# Patient Record
Sex: MALE | Race: WHITE
[De-identification: names, ages, dates, MRNs, and addresses within clinical notes are randomized per-mention and may not be internally consistent; named-entity substitution may affect disease eponyms.]

---

## 2018-02-13 ENCOUNTER — HOSPITAL ENCOUNTER (INPATIENT)
Dept: HOSPITAL 68 - ERH | Age: 58
LOS: 4 days | DRG: 897 | End: 2018-02-17
Attending: INTERNAL MEDICINE | Admitting: INTERNAL MEDICINE
Payer: COMMERCIAL

## 2018-02-13 VITALS — HEIGHT: 69 IN | WEIGHT: 205 LBS | BODY MASS INDEX: 30.36 KG/M2

## 2018-02-13 DIAGNOSIS — R00.0: ICD-10-CM

## 2018-02-13 DIAGNOSIS — I10: ICD-10-CM

## 2018-02-13 DIAGNOSIS — F41.9: ICD-10-CM

## 2018-02-13 DIAGNOSIS — M25.50: ICD-10-CM

## 2018-02-13 DIAGNOSIS — F32.9: ICD-10-CM

## 2018-02-13 DIAGNOSIS — Z88.0: ICD-10-CM

## 2018-02-13 DIAGNOSIS — F10.239: Primary | ICD-10-CM

## 2018-02-13 DIAGNOSIS — F12.20: ICD-10-CM

## 2018-02-13 LAB
ABSOLUTE GRANULOCYTE CT: 4.1 /CUMM (ref 1.4–6.5)
BASOPHILS # BLD: 0 /CUMM (ref 0–0.2)
BASOPHILS NFR BLD: 0.4 % (ref 0–2)
EOSINOPHIL # BLD: 0 /CUMM (ref 0–0.7)
EOSINOPHIL NFR BLD: 0.5 % (ref 0–5)
ERYTHROCYTE [DISTWIDTH] IN BLOOD BY AUTOMATED COUNT: 14.1 % (ref 11.5–14.5)
GRANULOCYTES NFR BLD: 58.4 % (ref 42.2–75.2)
HCT VFR BLD CALC: 45.4 % (ref 42–52)
LYMPHOCYTES # BLD: 2.5 /CUMM (ref 1.2–3.4)
MCH RBC QN AUTO: 32 PG (ref 27–31)
MCHC RBC AUTO-ENTMCNC: 34.4 G/DL (ref 33–37)
MCV RBC AUTO: 92.8 FL (ref 80–94)
MONOCYTES # BLD: 0.4 /CUMM (ref 0.1–0.6)
PLATELET # BLD: 203 /CUMM (ref 130–400)
PMV BLD AUTO: 8 FL (ref 7.4–10.4)
RED BLOOD CELL CT: 4.89 /CUMM (ref 4.7–6.1)
WBC # BLD AUTO: 7 /CUMM (ref 4.8–10.8)

## 2018-02-13 PROCEDURE — G0480 DRUG TEST DEF 1-7 CLASSES: HCPCS

## 2018-02-13 PROCEDURE — 2NBSP: CPT

## 2018-02-13 NOTE — ED PSYCHIATRIC COMPLAINT
History of Present Illness
 
General
Chief Complaint: ETOH/Drug Related Complaint
Stated Complaint: REQUESTING HIGHWATCH
Source: patient
Exam Limitations: no limitations
 
Vital Signs & Intake/Output
Vital Signs & Intake/Output
 Vital Signs
 
 
Date Time Temp Pulse Resp B/P B/P Pulse O2 O2 Flow FiO2
 
     Mean Ox Delivery Rate 
 
02/15 0936  102  132/90     
 
02/15 0800  102 18 132/90     
 
02/15 0651    146/88     
 
02/15 0641 98.4 96 20 150/100  97 Room Air  
 
02/14 2213 98.1 114 18 152/80  98 Room Air  
 
02/14 2000    132/78     
 
02/14 2000    132/78     
 
02/14 1711 97.9 734 45 6024/10     
 
02/14 1711 97.9 109 20 160/100  96 Room Air  
 
02/14 1610 98.0 104 16 149/90     
 
02/14 1610 98.0 104 16 140/90  98 Room Air  
 
02/14 1311 98.1 120 18 138/98  98 Room Air  
 
 
 ED Intake and Output
 
 
 02/15 0000 02/14 1200
 
Intake Total  
 
Output Total  
 
Balance  
 
   
 
Patient 205 lb 
 
Weight  
 
Weight Reported by Patient 
 
Measurement  
 
Method  
 
 
 
Allergies
Coded Allergies:
Penicillins (HIVES 02/13/18)
 
Reconcile Medications
Citalopram Hydrobromide (Citalopram HBr) 20 MG TABLET   1 TAB PO DAILY 
DEPRESSION  (Reported)
Lisinopril 40 MG TABLET   1 TAB PO DAILY HIGH BLOOD PRESSURE  (Reported)
 
Triage Note:
PT HERE FOR HIGHWATCH CLEARANCE STATES HIS BAL
 WAS TOO HIGH FOR HIM TO STAY THERE.  PT STATES HIS
 LAST DRINK WAS LAST EVENING.  PT ADMITS TO
 DRINKING 1 PINT OF VODKA DAILY.  PT DENIES DRUG
 USE.  PT DENIES SI/HI PT
Triage Nurses Notes Reviewed? yes
HPI:
Patient presents for evaluation of alcohol dependence.  Patient states that he 
has been drinking a pint of vodka daily for at least 10 years, off and on.  
Patient denies history of seizures.  He intends to go into alcohol 
rehabilitation at [facility].  He reported there today but was too intoxicated. 
He has no specific complaint at this time.
(Marianne WILLIS,Kendall WOLFF)
 
Past History
 
Travel History
Traveled to [place] past 21 day No
 
Medical History
Any Pertinent Medical History? see below for history
Cardiovascular: hypertension
Psychiatric: alcohol dependence, anxiety
 
Surgical History
Surgical History: non-contributory
 
Psychosocial History
What is your primary language English
Tobacco Use: Quit >30 days ago
ETOH Use: alcoholic
Illicit Drug Use: denies illicit drug use, marijuana (medicinal)
 
Family History
Hx Contributory? No
(Marianne WILLIS,Kendall WOLFF)
 
Review of Systems
 
 
Physical Exam
 
Physical Exam
General Appearance: see below
Neurological/Psychiatric: see below
Comments:
General: Alert, calm, cooperative
Head: Normocephalic, atraumatic
Eyes: Normal inspection, no nystagmus, EOMI
Ears: Normal inspection
Nose: Normal inspection
Throat: Moist mucosa
Neck: Supple, no goiter
Heart: Regular rate and rhythm, no murmurs rubs or gallops
Lungs: Clear to auscultation bilaterally with good air entry
Abdomen: Soft nontender nondistended, normal bowel sounds
Chest: Nontender
Extremities: Normal range of motion grossly, mild tremors present, no cyanosis 
clubbing or edema of the upper extremities
Neurologic: cranial nerves II through XII grossly intact, speech clear, gait 
normal
Psychiatric: No apparent delusions or hallucinations, no pressured speech or 
thought blocking
SAD PERSONS Done? patient not suicidal
(Marianne WILLIS,Kendall WOLFF)
 
Progress
Differential Diagnosis: alcohol withdrawal, alcohol intoxication, electrolyte 
abnormality, dehydration, psychiatric disorder
Plan of Care:
 Orders
 
 
Procedure Date/time Status
 
BASIC ELECTROLYTES PLUS BUN&CR 02/15 0500 Complete
 
MISSING MEDICATION FORM 02/15  UNK Active
 
Heart Healthy Diet 02/14 D Active
 
Vital Signs 02/14 1759 Active
 
Teach/Educate 02/14 1759 Active
 
Pain Treatment and Response 02/14 1759 Active
 
Nutritional Intake, Monitor 02/14 1759 Active
 
Isolation 02/14 1759 Active
 
Intake & Output 02/14 1759 Active
 
Patient Care Conference 02/14 1759 Active
 
Activity/Ambulation 02/14 1759 Active
 
Pathway - chart 02/14 1357 Active
 
House Staff 02/14 1357 Active
 
Code Status 02/14 1357 Active
 
LACTIC ACID 02/14 1348 Complete
 
Add-on Test (ER Only) 02/14 1333 Active
 
Admit to inpatient 02/14 1332 Active
 
Patient Data 02/14 1327 Active
 
ED Holding Orders 02/14 1309 Active
 
Vital Signs 02/14 1309 Active
 
Code Status 02/14 1309 Complete
 
VTE Mechanical Prophylaxis 02/14  UNK Active
 
Intake & Output 02/13 2032 Active
 
THYROID STIMULATING HORMONE 02/13 1740 Complete
 
 
 Current Medications
 
 
  Sig/Dang Start time  Last
 
Medication Dose  Stop Time Status Admin
 
Lorazepam 1 MG Q12H 02/16 0000 CAN 
 
(Ativan)   02/16 1201  
 
Lorazepam 1.5 MG BID 02/15 2200 AC 
 
(Ativan)     
 
Citalopram  20 MG DAILY 02/15 1000 AC 02/15
 
Hydrobromide     0936
 
(Celexa)     
 
Enoxaparin Sodium 40 MG DAILY 02/15 1000 AC 02/15
 
(Lovenox)     0936
 
Lisinopril 40 MG DAILY 02/15 1000 AC 02/15
 
(Prinivil)     0936
 
Lorazepam 0 Q1P PRN 02/14 1700 AC 
 
(Ativan)     
 
Folic Acid 1 MG DAILY 02/14 1000 AC 02/15
 
(Folic Acid)   02/16 1001  0936
 
Multivitamins 1 TAB DAILY 02/14 1000 AC 02/15
 
(Theragran Vitamins)     0936
 
Thiamine HCl 100 MG DAILY 02/14 1000 AC 02/15
 
(Vitamin B1)   02/16 1001  0935
 
 
 Laboratory Tests
 
 
 
02/15/18 0435:
Anion Gap 11, Estimated GFR > 60, BUN/Creatinine Ratio 25.0
 
02/14/18 1435:
Lactic Acid 1.1
 
2/14/2018 7:17:40 AM
Patient presenting with alcohol withdrawal.  May required Saxon to the 
hospital versus disposition to high watch patient medically stable.
 
 
10:05 AM
MUCH IMPROVED AT THIS TIME.  .  NO TREMORS.  WILL CONTINUE TO MONITOR, 
WILL LIKLELY GO TO HIGH WATCH.  WILL HOLD ATIVAN AT THIS TIME.
 
 
12:20 PM
PATIENT FEELS WELL, NO CONFUSION.  NO ATIVAN SINCE 6:40 AM.  STABLE FOR 
HIGHWATCH.
(Erasmo WILLIS,Leelee)
Comments:
2/13/2018 11:27:23 PM patient signed out to Dr. Steward at shift change over.
(Marianne WILLIS,Kendall WOLFF)
Initial ED EKG: SINUS TACHYCARDIA @ 109 BPM
(Erasmo WILLIS,Leelee)
 
Departure
 
Departure
Condition: Stable
Clinical Impression
Primary Impression: Alcohol dependence
Qualifiers:  Substance use status: uncomplicated Qualified Code: F10.20 - 
Alcohol dependence, uncomplicated
Referrals:
Patient Has No Primary Care Dr (PCP/Family)
 
Departure Forms:
Customer Survey
General Discharge Information
(Marianne WILLIS,Kendall WOLFF)
 
Departure
Comments
2/14/17, 7am. 
pt signed out to dr. carpenter
(Nichelle WILLIS,Ovidio FALL)
 
Departure
Time of Disposition: 1249
Disposition: STILL A PATIENT
 
Admission Note
Spoke With:
Yadira WILLIS,Nura
Documentation of Exam:
Documentation of any treatments & extenuating circumstances including Concerns 
Regarding Discharge (functional status, medication knowledge or non-compliance, 
living conditions, etc.) that warrant an admission rather than observation: [
CIWA MONITORING, ATIVAN TAPER, BP MANAGEMENT, CRISIS CONSULTATION, PLACEMENT FOR
ALCOHOL REHAB]
 
(Erasmo WILLIS,[facility])

## 2018-02-14 VITALS — SYSTOLIC BLOOD PRESSURE: 152 MMHG | DIASTOLIC BLOOD PRESSURE: 80 MMHG

## 2018-02-14 VITALS — DIASTOLIC BLOOD PRESSURE: 116 MMHG | SYSTOLIC BLOOD PRESSURE: 174 MMHG

## 2018-02-14 VITALS — SYSTOLIC BLOOD PRESSURE: 1660 MMHG | DIASTOLIC BLOOD PRESSURE: 10 MMHG

## 2018-02-14 VITALS — SYSTOLIC BLOOD PRESSURE: 149 MMHG | DIASTOLIC BLOOD PRESSURE: 90 MMHG

## 2018-02-14 VITALS — SYSTOLIC BLOOD PRESSURE: 132 MMHG | DIASTOLIC BLOOD PRESSURE: 78 MMHG

## 2018-02-14 NOTE — HISTORY & PHYSICAL
[facility] 02[facility]14[facility] 1336:
General Information and HPI
MD Statement:
I have seen and personally examined MEERA FIORE and documented this H&P.
 
The patient is a 57 year old M who presented with a patient stated chief 
complaint of [alcohol withdrawal].
 
Source of Information: patient
Exam Limitations: no limitations
History of Present Illness:
57-year-old male Presented from [facility] for alcohol withdrawal evaluation.  
The patient reported drinking almost daily for the past 10 years, his last drink
was yesterday.  On average he drinks half pints of vodka every day for the past 
2 months.  The patient reported that he try to detox at home multiple times in 
the past without any complication.  The patient denies any previous alcohol 
detox related admissions, seizure, hallucinations, or tremors. 
 
Patient denies smoking however he reports medical marijuana use intermittently 
for joints pain control.
 
Allergies/Medications
Allergies:
Coded Allergies:
Penicillins (HIVES 02/13/18)
 
Home Med list
Citalopram Hydrobromide (Citalopram HBr) 20 MG TABLET   1 TAB PO DAILY 
DEPRESSION  (Reported)
Lisinopril 40 MG TABLET   1 TAB PO DAILY HIGH BLOOD PRESSURE  (Reported)
 
 
Past History
 
Travel History
Traveled to [place] past 21 day No
 
Medical History
Neurological: ANXIETY
Cardiovascular: hypertension
Psychiatric: alcohol dependence, anxiety
 
Surgical History
Surgical History: non-contributory
 
Past Family/Social History
 
Psychosocial History
ETOH Use: alcoholic
Illicit Drug Use: denies illicit drug use, marijuana (medicinal)
 
Review of Systems
 
Review of Systems
Constitutional:
Reports: see HPI. 
 
Exam & Diagnostic Data
Last 24 Hrs of Vital Signs/I&O
 Vital Signs
 
 
Date Time Temp Pulse Resp B/P B/P Pulse O2 O2 Flow FiO2
 
     Mean Ox Delivery Rate 
 
02/14 1610 98.0 104 16 149/90     
 
02/14 1610 98.0 104 16 140/90  98 Room Air  
 
02/14 1311 98.1 120 18 138/98  98 Room Air  
 
02/14 1122 98.0 119 18 131/78  98 Room Air  
 
02/14 0920  113 18 131/79  98 Room Air  
 
02/14 0835 98.9 133 18 157/104  98 Room Air  
 
02/14 0717  130 20 141/102  98 Room Air  
 
02/14 0642 99.9 125 24 174/116     
 
02/14 0642 99.9 125 24 174/116     
 
02/14 0627 99.9 125 24 174/116     
 
02/14 0626 99.9 125 20 174/116  97 Room Air  
 
02/14 0332 97.0 98 20 142/65  99 Room Air  
 
02/14 0116 97.4 100 20 158/90  99 Room Air  
 
02/13 2354    164/82     
 
02/13 2131 97.7 109 16 166/96  97   
 
02/13 1951 97.2 131 16 131/73  96 Room Air  
 
02/13 1734 97.2 113 16 122/81  96 Room Air  
 
 
 Intake & Output
 
 
 02/14 1600 02/14 0800 02/14 0000
 
Intake Total   300
 
Output Total   
 
Balance   300
 
    
 
Intake, Oral   300
 
Patient   94.801 kg
 
Weight   
 
Weight   Reported by Patient
 
Measurement   
 
Method   
 
 
 
 
Physical Exam
General Appearance Alert, Oriented X3, Cooperative, No Acute Distress
Skin No Rashes
HEENT Atraumatic, PERRLA, EOMI, Mucous Membr. moist/pink
Neck No JVD
Cardiovascular Regular Rate, Normal S1, Normal S2, No Murmurs, TACHYCARDIA
Lungs Clear to Auscultation, Normal Air Movement
Abdomen Soft, No Tenderness
Neurological Normal Gait, Normal Speech, Strength at 5/5 X4 Ext, Sensation 
Intact, Cranial Nerves 3-12 NL, Reflexes 2+
Extremities No Clubbing, No Cyanosis, No Edema
Last 24 Hrs of Labs/Aiden:
 Laboratory Tests
 
02/14/18 1435:
Lactic Acid 1.1
 
02/13/18 2019:
Urine Opiates Screen < 100.00, Methadone Screen 67, Barbiturate Screen < 60, Ur 
Phencyclidine Scrn < 6.00, Amphetamines Screen < 100, U Benzodiazepines Scrn < 
85, Urine Cocaine Screen < 50, Urine Cannabis Screen 79.70  H
 
02/13/18 1740:
Anion Gap 22  H, Estimated GFR > 60, BUN/Creatinine Ratio 28.8  H, Glucose 94, 
Calcium 9.4, Total Bilirubin 1.5  H, AST 89  H, ALT 66, Alkaline Phosphatase 84,
Troponin I < 0.01, Total Protein 7.9, Albumin 4.7, Globulin 3.2, Albumin/
Globulin Ratio 1.5, TSH 1.480, CBC w Diff NO MAN DIFF REQ, RBC 4.89, MCV 92.8, 
MCH 32.0  H, MCHC 34.4, RDW 14.1, MPV 8.0, Gran % 58.4, Lymphocytes % 35.5, 
Monocytes % 5.2, Eosinophils % 0.5, Basophils % 0.4, Absolute Granulocytes 4.1, 
Absolute Lymphocytes 2.5, Absolute Monocytes 0.4, Absolute Eosinophils 0, 
Absolute Basophils 0, Serum Alcohol 183.0
 
 
Assessment/Plan
Assessment:
57-year-old male who presented for alcohol detox, while in the ED the patient 
was found to be anxious with a heart rate of 120s and BP 170s/110S. the patient 
has no history of alcohol withdrawal-related complication.  Patient has a past 
medical history of hypertension and an anxiety for which he is taking lisinopril
and Escitalopram.
 
#Alcohol detox
* Admitted to general medicine floor
* CIWA scoring system
* Ativan IV as per CIWA protocol.
* Oral folic acid and thiamine
 
#Hypertension/and anxiety
* Continue home medication including lisinopril and Escitalopram.
 
-Regular diet
-DVT PPx: Lovenox
-Full code
 
 
As Ranked By This Provider
Problem List:
 1. Alcohol dependence
   Qualifiers
 Substance use status: uncomplicated Qualified Code: F10.20 - Alcohol dependence
, uncomplicated
 
 
Core Measures/Misc (9/17)
 
Acute Coronary Syndrome
ACS Diagnosis: No
 
Congestive Heart Failure
Congestive Heart Failure Diagnosis No
 
Cerebrovascular Accident
CVA/TIA Diagnosis: No
 
VTE (View Protocol)
VTE Risk Factors Age>40
No Mechanical VTE Prophylaxis d/t N/A MechProphylax Ordered
No VTE Pharm Prophylaxis d/t NA PharmProphylax ordered
 
Sepsis (View protocol)
Sepsis Present: No
 
 
Carlos Manuel Loving MD 02/15/18 2759:
Attending MD Review Statement
 
Attending Statement
Attending MD Statement: examined this patient, discuss w/resident/PA/NP, agreed 
w/resident/PA/NP, reviewed EMR data (avail)

## 2018-02-15 VITALS — SYSTOLIC BLOOD PRESSURE: 140 MMHG | DIASTOLIC BLOOD PRESSURE: 92 MMHG

## 2018-02-15 VITALS — DIASTOLIC BLOOD PRESSURE: 88 MMHG | SYSTOLIC BLOOD PRESSURE: 146 MMHG

## 2018-02-15 VITALS — DIASTOLIC BLOOD PRESSURE: 102 MMHG | SYSTOLIC BLOOD PRESSURE: 142 MMHG

## 2018-02-15 VITALS — DIASTOLIC BLOOD PRESSURE: 98 MMHG | SYSTOLIC BLOOD PRESSURE: 142 MMHG

## 2018-02-15 VITALS — SYSTOLIC BLOOD PRESSURE: 132 MMHG | DIASTOLIC BLOOD PRESSURE: 90 MMHG

## 2018-02-15 VITALS — SYSTOLIC BLOOD PRESSURE: 152 MMHG | DIASTOLIC BLOOD PRESSURE: 100 MMHG

## 2018-02-15 VITALS — SYSTOLIC BLOOD PRESSURE: 150 MMHG | DIASTOLIC BLOOD PRESSURE: 100 MMHG

## 2018-02-15 NOTE — PN- HOUSESTAFF
**See Addendum**
Subjective
Follow-up For:
EtOH withdrawal
Subjective:
no complaints no significant withdrawal symptoms or anxiety
feeling well, wants to go to inpatient treatment, blood pressure has been 
elevated
on standing dose ativan
 
Review of Systems
Constitutional:
Reports: see HPI. 
 
Objective
Last 24 Hrs of Vital Signs/I&O
 Vital Signs
 
 
Date Time Temp Pulse Resp B/P B/P Pulse O2 O2 Flow FiO2
 
     Mean Ox Delivery Rate 
 
02/15 0936  102  132/90     
 
02/15 0800  102 18 132/90     
 
02/15 0651    146/88     
 
02/15 0641 98.4 96 20 150/100  97 Room Air  
 
 2213 98.1 114 18 152/80  98 Room Air  
 
2000    132/78     
 
2000    132/78     
 
 1711 97.9 038 36 7455/10     
 
 1711 97.9 109 20 160/100  96 Room Air  
 
 1610 98.0 104 16 149/90     
 
 1610 98.0 104 16 140/90  98 Room Air  
 
 1311 98.1 120 18 138/98  98 Room Air  
 
 1122 98.0 119 18 131/78  98 Room Air  
 
 
 Intake & Output
 
 
 02/15 1600 02/15 0800 02/15 0000
 
Intake Total   
 
Output Total   
 
Balance   
 
    
 
Patient   92.986 kg
 
Weight   
 
Weight   Reported by Patient
 
Measurement   
 
Method   
 
 
 
 
Physical Exam
General Appearance: Alert, Oriented X3, Cooperative, No Acute Distress
Cardiovascular: Regular Rate, Normal S1, Normal S2, No Murmurs
Lungs: Clear to Auscultation, Normal Air Movement
Abdomen: Normal Bowel Sounds, Soft, No Tenderness, No Masses
Extremities: No Clubbing, No Cyanosis, No Edema, Normal Pulses
Current Medications:
 Current Medications
 
 
  Sig/Dang Start time  Last
 
Medication Dose Route Stop Time Status Admin
 
Acetaminophen 0 .STK-MED ONE  1429 DC 
 
  PO   
 
Acetaminophen 975 MG ONCE ONE  1315 DC 
 
  PO  1316  1426
 
Citalopram  20 MG DAILY 02/15 1000 AC 02/15
 
Hydrobromide  PO   0936
 
Enoxaparin Sodium 40 MG DAILY 02/15 1000 AC 02/15
 
  SC   0936
 
Folic Acid 1 MG DAILY  1000 AC 02/15
 
  PO  1001  0936
 
Gabapentin 300 MG Q8  0600 DC 
 
  PO   1304
 
Lisinopril 40 MG DAILY 02/15 1000 AC 02/15
 
  PO   0936
 
Lorazepam 1 MG Q12H  0000 CAN 
 
  PO  1201  
 
Lorazepam 1.5 MG BID 02/15 2200 AC 
 
  PO   
 
Lorazepam 1.5 MG Q6 02/15 0600 DC 02/15
 
  PO 02/15 1801  0513
 
Lorazepam 0 Q1P PRN  1700 AC 
 
  IV   
 
Lorazepam 2 MG Q6  0845 DC 
 
  PO 02/15 0001  2312
 
Lorazepam 2 MG Q2P PRN  0845 DC 
 
  IV   
 
Lorazepam 1 MG Q2P PRN  0845 DC 
 
  IV   
 
Multivitamins 1 TAB DAILY  1000 AC 02/15
 
  PO   0936
 
Thiamine HCl 100 MG DAILY  1000 AC 02/15
 
  PO  1001  0935
 
 
 
 
Last 24 Hrs of Lab/Aiden Results
Last 24 Hrs of Labs/Mics:
 Laboratory Tests
 
02/15/18 0435:
Anion Gap 11, Estimated GFR > 60, BUN/Creatinine Ratio 25.0
 
18 1435:
Lactic Acid 1.1
 
 
Assessment/Plan
Assessment:
57-year-old male who presented for alcohol detox, while in the ED the patient 
was found to be anxious with a heart rate of 120s and BP 170s/110S. the patient 
has no history of alcohol withdrawal-related complication.  Patient has a past 
medical history of hypertension and an anxiety for which he is taking lisinopril
and Escitalopram.
 
Alcohol detox:
* CIWA scoring 0-11 past 24 hours
 Decreased standing Ativan from 1.5mg Q6H to BID, 1mg tmrw morning then stop
* Ativan IV as per CIWA protocol.
* Oral folic acid and thiamine
 
Hypertension:
 Likely elevated from acute withdrawal
 Continue lisinopril and monitor blood pressure
Anxiety:
* Continue Escitalopram.
 
Regular diet
DVT ppx: Lovenox 40mg subcutaneous daily
Full code
Problem List:
 1. Alcohol dependence
 
Pain Ratin
Pain Location:
n/a
Pain Goal: Pain 4 or less
Pain Plan:
prn
Tomorrow's Labs & Rationales:
none

## 2018-02-16 VITALS — SYSTOLIC BLOOD PRESSURE: 144 MMHG | DIASTOLIC BLOOD PRESSURE: 84 MMHG

## 2018-02-16 VITALS — DIASTOLIC BLOOD PRESSURE: 96 MMHG | SYSTOLIC BLOOD PRESSURE: 130 MMHG

## 2018-02-16 VITALS — SYSTOLIC BLOOD PRESSURE: 138 MMHG | DIASTOLIC BLOOD PRESSURE: 104 MMHG

## 2018-02-16 VITALS — DIASTOLIC BLOOD PRESSURE: 104 MMHG | SYSTOLIC BLOOD PRESSURE: 138 MMHG

## 2018-02-16 VITALS — DIASTOLIC BLOOD PRESSURE: 100 MMHG | SYSTOLIC BLOOD PRESSURE: 140 MMHG

## 2018-02-16 VITALS — SYSTOLIC BLOOD PRESSURE: 130 MMHG | DIASTOLIC BLOOD PRESSURE: 90 MMHG

## 2018-02-16 NOTE — PN- HOUSESTAFF
Robert WILLIS,Nolan 18 0847:
Subjective
Follow-up For:
etoh detox
anxiety
htn
tachycardia
Subjective:
patient is not complaining of significant withdrawal symptoms
[facility]
remains tachycardic and hypertensive on ativan taper
 
Review of Systems
Constitutional:
Reports: see HPI. 
 
Objective
Last 24 Hrs of Vital Signs/I&O
 Vital Signs
 
 
Date Time Temp Pulse Resp B/P B/P Pulse O2 O2 Flow FiO2
 
     Mean Ox Delivery Rate 
 
 1020  114  142/90     
 
 1000 97.8 102 20 140/100  96 Room Air  
 
 0647 98.0 110 22 130/96  97 Room Air  
 
 0200  102  138/104     
 
 0148 97.6 102 22 138/104  97 Room Air  
 
02/15 2205 98.0 98 20 142/102  98 Room Air  
 
02/15 1600  104  140/92     
 
02/15 1600  105 18 140/92     
 
02/15 1542  118  142/98     
 
02/15 1413 97.8 108 20 152/100  97 Room Air  
 
 
 Intake & Output
 
 
  1600  0800  0000
 
Intake Total  480 250
 
Output Total   
 
Balance  480 250
 
    
 
Intake, IV   10
 
Intake, Oral  480 240
 
Number  0 0
 
Bowel   
 
Movements   
 
 
 
 
Physical Exam
General Appearance: Alert, Oriented X3, Cooperative, No Acute Distress
Cardiovascular: Regular Rate, Normal S1, Normal S2, No Murmurs
Lungs: Clear to Auscultation, Normal Air Movement
Abdomen: Normal Bowel Sounds, Soft, No Tenderness, No Masses
Extremities: No Clubbing, No Cyanosis, No Edema, Normal Pulses
Current Medications:
 Current Medications
 
 
  Sig/Dang Start time  Last
 
Medication Dose Route Stop Time Status Admin
 
Citalopram  20 MG DAILY 02/15 1000 AC 
 
Hydrobromide  PO   1020
 
Enoxaparin Sodium 40 MG DAILY 02/15 1000 AC 
 
  SC   1020
 
Folic Acid 1 MG DAILY  1000 DC 
 
  PO  1001  1020
 
Lisinopril 40 MG DAILY 02/15 1000 AC 
 
  PO   1020
 
Lorazepam 1 MG BID  1000 AC 
 
  PO   1020
 
Lorazepam 1.5 MG BID 02/15 2200 DC 
 
  PO   
 
Lorazepam 1 MG ONCE ONE 02/15 1600 DC 
 
  IV 02/15 1601  
 
Lorazepam 1.5 MG Q8 02/15 1550 DC 
 
  PO   0559
 
Lorazepam 1 MG ONCE ONE 02/15 1430 DC 02/15
 
  IV 02/15 1431  1447
 
Lorazepam 0 Q1P PRN  1700 AC 
 
  IV   
 
Magnesium Oxide 400 MG ONE ONE 02/15 1915 DC 02/15
 
  PO 02/15 1916  2137
 
Multivitamins 1 TAB DAILY  1000 AC 02/15
 
  PO   0936
 
Potassium Chloride 40 MEQ ONCE ONE 02/15 1915 DC 02/15
 
  PO 02/15 1916  2136
 
Thiamine HCl 100 MG DAILY  1000 DC 02/15
 
  PO  1001  0935
 
 
 
 
Last 24 Hrs of Lab/Aiden Results
Last 24 Hrs of Labs/Mics:
 Laboratory Tests
 
18 0633:
Anion Gap 11, Estimated GFR > 60, BUN/Creatinine Ratio 24.3, Phosphorus 2.5, 
Magnesium 2.0
 
 
Assessment/Plan
Assessment:
57 year old male with PMH significant for anxiety and HTN presented with 
presented for alcohol detox with tachycardia and hypertension on arrival.
 
Alcohol detox:
 Remains mildly tachycardic and hypertensive
 CIWA score 0
 Decreased standing Ativan from 1.5mg to 1mg BID
 Ativan IV as per CIWA protocol.
 Oral folic acid and thiamine
 
Hypertension:
 Likely elevated from acute withdrawal
 Continue lisinopril, elevated diastolic pressures
 Consider adding metoprolol for tachycardia/HTN if persisted abnormal vital 
signs as EtOH withdrawal wanes
 
Anxiety:
 Continue Escitalopram
 
Regular diet
DVT ppx: Lovenox 40mg subcutaneous daily
Full code
Problem List:
 1. Alcohol dependence
 
Pain Ratin
Pain Location:
n/a
Pain Goal: Pain 4 or less
Pain Plan:
prn
Tomorrow's Labs & Rationales:
none
 
 
Carlos Manuel Loving MD 18 1124:
Attending MD Review Statement
 
Attending Statement
Attending MD Statement: examined this patient, discuss w/resident/PA/NP, agreed 
w/resident/PA/NP, reviewed EMR data (avail)
Attending Assessment/Plan:
Still hypertensive, tachycardic, showing signs of withdrawal, will continue 
Ativan taper until tomorrow.

## 2018-02-16 NOTE — PATIENT DISCHARGE INSTRUCTIONS
Discharge Instructions
 
General Discharge Information
You were seen/treated for:
alcohol withdrawal
 
Acute Coronary Syndrome
 
Inclusion Criteria
At DC or during hospital stay patient has or had the following:
ACS DIAGNOSIS No
 
Discharge Core Measures
Meds if any: Prescribed or Continued at Discharge
Meds if any: NOT Prescribed or Continued at Discharge
 
Congestive Heart Failure
 
Inclusion Criteria
At DC or during hospital stay patient has or had the following:
CHF DIAGNOSIS No
 
Discharge Core Measures
Meds if any: Prescribed or Continued at Discharge
Meds if any: NOT Prescribed or Continued at Discharge
 
Cerebrovascular accident
 
Inclusion Criteria
At DC or during hospital stay patient has or had the following:
CVA/TIA Diagnosis No
 
Discharge Core Measures
Meds if any: Prescribed or Continued at Discharge
Meds if any: NOT Prescribed or Continued at Discharge
 
Venous thromboembolism
 
Inclusion Criteria
VTE Diagnosis No
VTE Type NONE
VTE Confirmed by (Test) NONE
 
Discharge Core Measures
- Per Current guidelines, there needs to be overlap
- treatment for the first 5 days of Warfarin therapy.
- If discharged on Warfarin prior to 5 days of
- overlap therapy, the patient will need to be
- assessed for post discharge needs including
- *Post discharge parental anticoagulation
- *Warfarin and/or parental anticoagulation education
- *Follow up date to check INR post discharge
At least 5 days overlap therapy as Inpatient No
Meds if any: Prescribed or Continued at Discharge
Note: Overlap Therapy is Warfarin and Anticoagulant
Meds if any: NOT Prescribed or Continued at Discharge

## 2018-02-17 VITALS — SYSTOLIC BLOOD PRESSURE: 140 MMHG | DIASTOLIC BLOOD PRESSURE: 100 MMHG

## 2018-02-17 VITALS — SYSTOLIC BLOOD PRESSURE: 132 MMHG | DIASTOLIC BLOOD PRESSURE: 90 MMHG

## 2018-02-17 VITALS — SYSTOLIC BLOOD PRESSURE: 125 MMHG | DIASTOLIC BLOOD PRESSURE: 90 MMHG

## 2018-02-17 VITALS — DIASTOLIC BLOOD PRESSURE: 68 MMHG | SYSTOLIC BLOOD PRESSURE: 138 MMHG

## 2018-02-17 NOTE — PN- HOUSESTAFF
Hudson WILLIS,Jevon 18 0927:
Subjective
Follow-up For:
Alcohol detox
 
Subjective:
Patient is seen and examined at bedside.  He does not endorse any acute 
complaints including tremors, hallucination, increased anxiety, seizure-like 
activity.  CIWA scores have been zero for the past few days.  No acute overnight
event reported by nursing staff.
 
Review of Systems
Constitutional:
Reports: no symptoms. 
 
Objective
Last 24 Hrs of Vital Signs/I&O
 Vital Signs
 
 
Date Time Temp Pulse Resp B/P B/P Pulse O2 O2 Flow FiO2
 
     Mean Ox Delivery Rate 
 
 1046  106  142/98     
 
 0640 97.6 75 18 138/68  96 Room Air  
 
 2149 97.4 82 20 144/84  96 Room Air  
 
 1518 97.7 97 20 130/90  97 Room Air  
 
 
 Intake & Output
 
 
  1600  0800  0000
 
Intake Total  450 650
 
Output Total   
 
Balance  450 650
 
    
 
Intake, Oral  450 650
 
 
 
 
Physical Exam
General Appearance: Alert, Oriented X3, Cooperative
Cardiovascular: Regular Rate, Normal S1, Normal S2
Lungs: Clear to Auscultation, Normal Air Movement
Abdomen: Normal Bowel Sounds, Soft, No Tenderness
Neurological: Normal Gait, Normal Speech, Strength at 5/5 X4 Ext, Sensation 
Intact
Extremities: No Clubbing, No Cyanosis, No Edema, Normal Pulses
Current Medications:
 Current Medications
 
 
  Sig/Dang Start time  Last
 
Medication Dose Route Stop Time Status Admin
 
Citalopram  20 MG DAILY 02/15 1000 DCD 
 
Hydrobromide  PO   1042
 
Enoxaparin Sodium 40 MG DAILY 02/15 1000 DCD 
 
  SC   1046
 
Lisinopril 40 MG DAILY 02/15 1000 DCD 
 
  PO   1046
 
Lorazepam 1 MG BID  1000 DCD 
 
  PO   1041
 
Lorazepam 0 Q1P PRN  1700 DCD 
 
  IV   
 
Metoprolol Tartrate 25 MG ONCE ONE  1415 DC 
 
  PO  1416  1550
 
Multivitamins 1 TAB DAILY  1000 DCD 
 
  PO   1122
 
 
 
 
Last 24 Hrs of Lab/Aiden Results
Last 24 Hrs of Labs/Mics:
 Laboratory Tests
 
18 0613:
Anion Gap 13, Estimated GFR > 60, BUN/Creatinine Ratio 21.4
 
 
Assessment/Plan
Assessment:
57 year old male with PMH significant for anxiety and HTN presented with 
presented for alcohol detox with tachycardia and hypertension on arrival.
 
Alcohol detox:
 VSS stable.
 CIWA score 0 for the past 3 days
 continue Tivan taper, will give 1mg once today then stop.
 Ativan IV as per CIWA protocol.
 Oral folic acid and thiamine
 
Hypertension:
 Improving. Was probably exacerbated by withdrawal.
 Continue lisinopril, elevated diastolic pressures
 O/n HR less than 100, tachycardia seem to have resolved. Was probably 
exacerbated by withdrawal. Will consider adding metroprol low dose.
 
Anxiety:
 Continue Escitalopram
 
Disposition: Medically stable, arraingement for [facility] rehab have been made.
Will cordinate with case management.
Problem List:
 1. Alcohol dependence
 
Pain Ratin
Pain Location:
none
Pain Goal: Remain pain free
Pain Plan:
per pathway
Tomorrow's Labs & Rationales:
nine-discharge
 
 
Carlos Manuel Loving MD 18 1629:
Attending MD Review Statement
 
Attending Statement
Attending MD Statement: examined this patient, discuss w/resident/PA/NP, agreed 
w/resident/PA/NP, reviewed EMR data (avail)
Attending Assessment/Plan:
Nno further signs of withdrawal.  Will start Metoprolol 25mg BID for tachycardia
and HTN, and discharge to [facility] when bed is available.

## 2018-02-17 NOTE — DISCHARGE SUMMARY
Visit Information
 
Visit Dates
Admission Date:
02/14/18
 
Discharge Date:
02/17/18
 
 
Hospital Course
 
Course
Attending Physician:
Carlos Manuel Loving MD
 
Primary Care Physician:
Patient Has No Primary Care Dr
 
Hospital Course:
57-year-old male who presented for alcohol detox, while in the ED the patient 
was found to be anxious with a heart rate of 120s and BP 170s/110S. the patient 
has no history of alcohol withdrawal-related complication.  Patient has a past 
medical history of hypertension and an anxiety for which he is taking lisinopril
and Escitalopram. He was prescribed ativan for anxiety and tachycardia in the 
past. 
 
The patient was admitted for Alcohol detox.  The patient was admitted to general
medicine floor, placed on CIWA scoring system, Ativan IV as per CIWA protocol, 
PO Ativan taper, and supplement with folic acid and thiamine. For Hypertensions 
and anxiety We continue home medications including lisinopril and Escitalopram. 
The plan for the patient is to placed in high watch post DC.
Allergies:
Coded Allergies:
Penicillins (HIVES 02/13/18)
 
 
Disposition Summary
 
Disposition
Principal Diagnosis:
Alcohol detox and anxiety
Additional Diagnosis:
Hypertensions
Discharge Disposition: home or self care
 
Discharge Instructions
 
General Discharge Information
Code Status: Full Code
Patient's Diet:
regular diet 
Patient's Activity:
as tolerated 
Follow-Up Instructions/Appts:
PCP post DC within one week
 
Medications at Discharge
Discharge Medications:
Continue taking these medications:
Lisinopril (Lisinopril) 40 MG TABLET
    1 Tablet ORAL DAILY
    Qty = 30
    Comments:
        Last Taken: 2/17/18
              Time: 1030
 
Citalopram Hydrobromide (Citalopram HBr) 20 MG TABLET
    1 Tablet ORAL DAILY
    Qty = 45
    Comments:
       Last Taken: 2/17/18
             Time: 1030
 
Start taking the following new medications:
Metoprolol Tartrate (Metoprolol Tartrate) 25 MG TABLET
    1 Tablet ORAL TWICE DAILY
    Qty = 60
    No Refills
    Comments:
        Last Taken: 2/17/18
              Time: 1515
 
 
Copies To:
Carlos Manuel Loving MD